# Patient Record
Sex: MALE | URBAN - METROPOLITAN AREA
[De-identification: names, ages, dates, MRNs, and addresses within clinical notes are randomized per-mention and may not be internally consistent; named-entity substitution may affect disease eponyms.]

---

## 2024-08-15 ENCOUNTER — TELEPHONE (OUTPATIENT)
Dept: DERMATOLOGY | Facility: CLINIC | Age: 79
End: 2024-08-15

## 2024-09-12 ENCOUNTER — PROCEDURE VISIT (OUTPATIENT)
Dept: DERMATOLOGY | Facility: CLINIC | Age: 79
End: 2024-09-12
Payer: COMMERCIAL

## 2024-09-12 VITALS
OXYGEN SATURATION: 95 % | SYSTOLIC BLOOD PRESSURE: 140 MMHG | DIASTOLIC BLOOD PRESSURE: 80 MMHG | HEART RATE: 62 BPM | WEIGHT: 209.8 LBS | TEMPERATURE: 97.3 F

## 2024-09-12 DIAGNOSIS — C44.92 SCC (SQUAMOUS CELL CARCINOMA): Primary | ICD-10-CM

## 2024-09-12 PROCEDURE — 17311 MOHS 1 STAGE H/N/HF/G: CPT | Performed by: STUDENT IN AN ORGANIZED HEALTH CARE EDUCATION/TRAINING PROGRAM

## 2024-09-12 PROCEDURE — 17312 MOHS ADDL STAGE: CPT | Performed by: STUDENT IN AN ORGANIZED HEALTH CARE EDUCATION/TRAINING PROGRAM

## 2024-09-12 RX ORDER — TAMSULOSIN HYDROCHLORIDE 0.4 MG/1
CAPSULE ORAL
COMMUNITY
Start: 2024-08-18

## 2024-09-12 RX ORDER — BACITRACIN ZINC AND POLYMYXIN B SULFATE 500; 10000 [USP'U]/G; [USP'U]/G
OINTMENT OPHTHALMIC
COMMUNITY
Start: 2024-05-29

## 2024-09-12 RX ORDER — PRAVASTATIN SODIUM 40 MG
TABLET ORAL
COMMUNITY
Start: 2024-09-08

## 2024-09-12 RX ORDER — LOSARTAN POTASSIUM 100 MG/1
100 TABLET ORAL DAILY
COMMUNITY
Start: 2024-08-18

## 2024-09-12 RX ORDER — CLOPIDOGREL BISULFATE 75 MG/1
75 TABLET ORAL DAILY
COMMUNITY

## 2024-09-12 RX ORDER — TETRACAINE HYDROCHLORIDE 5 MG/ML
2 SOLUTION OPHTHALMIC ONCE
Status: COMPLETED | OUTPATIENT
Start: 2024-09-12 | End: 2024-09-12

## 2024-09-12 RX ADMIN — TETRACAINE HYDROCHLORIDE 2 DROP: 5 SOLUTION OPHTHALMIC at 16:29

## 2024-09-12 NOTE — LETTER
2024     Turner Sal DO  [address] [facility] [address]    Patient: Ion Prado   YOB: 1945   Date of Visit: 2024       Dear Dr. Thompson:    Thank you for referring Ion Prado to me for his Mohs. Below are my notes for this consultation.    If you have questions, please do not hesitate to call me. I look forward to following your patient along with you.         Sincerely,        Cher Pizarro MD        CC: No Recipients    Cher Pizarro MD  2024  4:23 PM  Incomplete  MOHS Procedure Note    Patient: Ion Prado  : 1945  MRN: [id number]  Date: 24     History of Present Illness: The patient is a 78 y.o. male who presents with complaints of a biopsy proven SCC on the right lower eyelid.    Past Medical History:   Diagnosis Date   • Squamous cell skin cancer 2024    right lower lid, mohs       Past Surgical History:   Procedure Laterality Date   • CARDIAC PACEMAKER PLACEMENT  2016   • MOHS SURGERY Right 2024    SCC right lower lid, Dr Pizarro   • VALVE REPLACEMENT  2016         Current Outpatient Medications:   •  bacitracin-polymyxin b ophthalmic ointment, Apply to right lower lid margin twice a day, Disp: , Rfl:   •  clopidogrel (PLAVIX) 75 mg tablet, Take 75 mg by mouth daily, Disp: , Rfl:   •  cyanocobalamin (VITAMIN B-12) 1000 MCG tablet, Take 2,000 mcg by mouth daily, Disp: , Rfl:   •  losartan (COZAAR) 100 MG tablet, Take 100 mg by mouth daily, Disp: , Rfl:   •  pravastatin (PRAVACHOL) 40 mg tablet, , Disp: , Rfl:   •  tamsulosin (FLOMAX) 0.4 mg, take 1 capsule by mouth every day at night, Disp: , Rfl:     Current Facility-Administered Medications:   •  tetracaine 0.5 % ophthalmic solution 2 drop, 2 drop, Right Eye, Once,     Allergies   Allergen Reactions   • Soap Rash     Dial Brand      Other Reaction(s): Rash       Physical Exam:   Vitals:    24 1043   BP: 140/80   Pulse: 62   Temp:    SpO2: 95%     General:  Awake, Alert, Oriented x 3, Mood and affect appropriate  Respiratory: Respirations even and unlabored  Cardiovascular: Peripheral pulses intact; no edema  Musculoskeletal Exam: N/A  Skin: right lower eyelid with a 1.8 x 1.4 cm keratotic nodule on the right lower eyelid margin onto lower eyelid.  No palpable cervical LAD    Assessment: Biopsy proven SCC on the right lower eyelid     Plan: Mohs    Time of H&P Completion: 7:48AM    MOHS Procedure Timeout      Flowsheet Row Most Recent Value   Timeout: 0748   Patient Identity Verified: Yes   Correct Site Verified: Yes   Correct Procedure Verified: Yes            MOHS Diagnosis/Indication/Location/ID      Flowsheet Row Most Recent Value   Pathology Type Squamous cell carcinoma   Anatomic Site right lower eyelid   Indications for MOHS tumor location   MOHS ID [id number]               MOHS Site/Accession/Pre-Post      Flowsheet Row Most Recent Value   Original Site Identified (as submitted by referring clinician) Photo, Referral   Biopsy Accession/Specimen # (as submitted by referring clincian) [id number]   Pre-MOHS Size Length (cm) 1.8   Pre-MOHS Size Width (cm) 1.4   Post-MOHS Size-Length (cm) 3.3   Post MOHS Size-Width (cm) 1.5   Repair Type Patient referred to another physician   Anesthetic Used 1% Lidocaine with epinephrine  [with bicarbonate]            MOHS Tumor Stage 1 Information      Flowsheet Row Most Recent Value   Tissue Sections (blocks) 1   Microscopic Exam Section 1: Examination revealed a keratin-producing proliferation of atypical keratinocytes with invasion into the reticular dermis consistent with invasive squamous cell carcinoma. Mitoses and atypical forms are evident.   Tumor Clear After Stage I? No            MOHS Tumor Stage 2 Information      Flowsheet Row Most Recent Value   Tissue Sections (blocks) 3   Microscopic Exam Section 1: No tumor identified in section.   Microscopic Exam Section 2: No tumor identified in section.   Microscopic Exam  Section 3: No tumor identified in section.   Tumor Clear After Stage II? Yes              Patient identified, procedure verified, site identified and verified. Time out completed. Surgical removal of the lesion discussed with the patient (risks and benefits, including possibility of scarring, infection, recurrence or potential for further treatment)  I have specifically identified the site with the patient. I have discussed the fact that the patient will have a scar after the procedure regardless of granulation or repair with sutures. I have discussed that the repair options can range from granulation in some cases to linear or curvilinear closures to larger flaps or grafts.  There are sometimes flaps or grafts used that require multiples stages of surgery and will not be completed today, rather be completed over a series of appointments. I have discussed that occasionally due to location, size or depth of the lesion I may recommend consultation with and transfer of care for further removal or the reconstruction to another provider such as ophthalmology surgery, plastic surgery, ENT surgery, or surgical oncology. There are cases in which other testing such as imaging with MRI or CT scan or testing of lymph nodes is recommended because of the nature/depth/location of tumor seen during the removal. There is a risk of injury to nerves causing temporary or permanent numbness or the inability to move muscles full such as the inability to lift eyebrows. Questions answered and verbal and written consent was obtained.    The tumor qualifies for Mohs based on AUC criteria. Dr. Pizarro served as the surgeon and pathologist during the procedure.  The patient tolerated the procedure well.  The wound was dressed with a non-stick pad, and a compression dressing. Wound care was discussed with the patient, and a wound care instruction sheet was given.    The patient is due to see Dr. Thompson (Oculoplastics) for closure on 09/12/24.      Postoperative care: Wound care discussed at length.  I urged the patient to call us if any problems or question should arise.     Complications: none  Post-op medications: none  Patient condition after procedure: stable  Discharge plans: Closure planned with referral doctor as noted above. Discussed need to follow up with general dermatologist given hx of skin cancer as he is at high risk for new cutaneous malignancy in the first year. Pt voiced understanding. Will send message to staff to call to schedule if he desires.

## 2024-09-12 NOTE — Clinical Note
This is a new patient to us in Mohs- hehad his Mohs but needs to establish care with a general dermatologist- can we please call to get him scheduled?   TY!

## 2024-09-12 NOTE — PROGRESS NOTES
Mohs Procedure Note    Patient: Ion Prado  : 1945  MRN: [id number]  Date: 24     History of Present Illness: The patient is a 78 y.o. male who presents with complaints of a biopsy proven SCC on the right lower eyelid.    Past Medical History:   Diagnosis Date    Squamous cell skin cancer 2024    right lower lid, mohs       Past Surgical History:   Procedure Laterality Date    CARDIAC PACEMAKER PLACEMENT  2016    MOHS SURGERY Right 2024    SCC right lower lid, Dr Pizarro    VALVE REPLACEMENT  2016         Current Outpatient Medications:     bacitracin-polymyxin b ophthalmic ointment, Apply to right lower lid margin twice a day, Disp: , Rfl:     clopidogrel (PLAVIX) 75 mg tablet, Take 75 mg by mouth daily, Disp: , Rfl:     cyanocobalamin (VITAMIN B-12) 1000 MCG tablet, Take 2,000 mcg by mouth daily, Disp: , Rfl:     losartan (COZAAR) 100 MG tablet, Take 100 mg by mouth daily, Disp: , Rfl:     pravastatin (PRAVACHOL) 40 mg tablet, , Disp: , Rfl:     tamsulosin (FLOMAX) 0.4 mg, take 1 capsule by mouth every day at night, Disp: , Rfl:     Current Facility-Administered Medications:     tetracaine 0.5 % ophthalmic solution 2 drop, 2 drop, Right Eye, Once,     Allergies   Allergen Reactions    Soap Rash     Dial Brand      Other Reaction(s): Rash       Physical Exam:   Vitals:    24 1043   BP: 140/80   Pulse: 62   Temp:    SpO2: 95%     General: Awake, Alert, Oriented x 3, Mood and affect appropriate  Respiratory: Respirations even and unlabored  Cardiovascular: Peripheral pulses intact; no edema  Musculoskeletal Exam: N/A  Skin: right lower eyelid with a 1.8 x 1.4 cm keratotic nodule on the right lower eyelid margin onto lower eyelid.  No palpable cervical LAD    Assessment: Biopsy proven SCC on the right lower eyelid     Plan: Mohs    Time of H&P Completion: 7:48AM    MOHS Procedure Timeout      Flowsheet Row Most Recent Value   Timeout: 6003   Patient Identity Verified: Yes   Correct  Site Verified: Yes   Correct Procedure Verified: Yes            MOHS Diagnosis/Indication/Location/ID      Flowsheet Row Most Recent Value   Pathology Type Squamous cell carcinoma   Anatomic Site right lower eyelid   Indications for MOHS tumor location   MOHS ID [id number]               MOHS Site/Accession/Pre-Post      Flowsheet Row Most Recent Value   Original Site Identified (as submitted by referring clinician) Photo, Referral   Biopsy Accession/Specimen # (as submitted by referring clincian) [id number]   Pre-MOHS Size Length (cm) 1.8   Pre-MOHS Size Width (cm) 1.4   Post-MOHS Size-Length (cm) 3.3   Post MOHS Size-Width (cm) 1.5   Repair Type Patient referred to another physician   Anesthetic Used 1% Lidocaine with epinephrine  [with bicarbonate]            MOHS Tumor Stage 1 Information    Level: Muscle and mucosa  Flowsheet Row Most Recent Value   Tissue Sections (blocks) 1   Microscopic Exam Section 1: Examination revealed a keratin-producing proliferation of atypical keratinocytes with invasion into the reticular dermis consistent with invasive squamous cell carcinoma. Mitoses and atypical forms are evident.   Tumor Clear After Stage I? No            MOHS Tumor Stage 2 Information    Level: Muscle and mucosa  Flowsheet Row Most Recent Value   Tissue Sections (blocks) 3   Microscopic Exam Section 1: No tumor identified in section.   Microscopic Exam Section 2: No tumor identified in section.   Microscopic Exam Section 3: No tumor identified in section.   Tumor Clear After Stage II? Yes          Patient identified, procedure verified, site identified and verified. Time out completed. Surgical removal of the lesion discussed with the patient (risks and benefits, including possibility of scarring, infection, recurrence or potential for further treatment)  I have specifically identified the site with the patient. I have discussed the fact that the patient will have a scar after the procedure regardless of  granulation or repair with sutures. I have discussed that the repair options can range from granulation in some cases to linear or curvilinear closures to larger flaps or grafts.  There are sometimes flaps or grafts used that require multiples stages of surgery and will not be completed today, rather be completed over a series of appointments. I have discussed that occasionally due to location, size or depth of the lesion I may recommend consultation with and transfer of care for further removal or the reconstruction to another provider such as ophthalmology surgery, plastic surgery, ENT surgery, or surgical oncology. There are cases in which other testing such as imaging with MRI or CT scan or testing of lymph nodes is recommended because of the nature/depth/location of tumor seen during the removal. There is a risk of injury to nerves causing temporary or permanent numbness or the inability to move muscles full such as the inability to lift eyebrows. Questions answered and verbal and written consent was obtained.    The tumor qualifies for Mohs based on AUC criteria. Dr. Pizarro served as the surgeon and pathologist during the procedure.  The patient tolerated the procedure well.  The wound was dressed with a non-stick pad, and a compression dressing. Wound care was discussed with the patient, and a wound care instruction sheet was given.    The patient is due to see Dr. Thompson (Oculoplastics) for closure on 09/12/24.     Postoperative care: Wound care discussed at length.  I urged the patient to call us if any problems or question should arise.     Complications: none  Post-op medications: none  Patient condition after procedure: stable  Discharge plans: Closure planned with referral doctor as noted above. Discussed need to follow up with general dermatologist given hx of skin cancer as he is at high risk for new cutaneous malignancy in the first year. Pt voiced understanding. Will send message to staff to call  to schedule if he desires.

## 2024-10-31 ENCOUNTER — OFFICE VISIT (OUTPATIENT)
Age: 79
End: 2024-10-31
Payer: COMMERCIAL

## 2024-10-31 VITALS — WEIGHT: 211.3 LBS | TEMPERATURE: 97.9 F

## 2024-10-31 DIAGNOSIS — D22.9 MULTIPLE MELANOCYTIC NEVI: ICD-10-CM

## 2024-10-31 DIAGNOSIS — L81.4 LENTIGINES: ICD-10-CM

## 2024-10-31 DIAGNOSIS — L85.3 XEROSIS OF SKIN: ICD-10-CM

## 2024-10-31 DIAGNOSIS — Z85.828 HISTORY OF SCC (SQUAMOUS CELL CARCINOMA) OF SKIN: Primary | ICD-10-CM

## 2024-10-31 DIAGNOSIS — D18.01 CHERRY ANGIOMA: ICD-10-CM

## 2024-10-31 DIAGNOSIS — L82.1 SEBORRHEIC KERATOSES: ICD-10-CM

## 2024-10-31 PROCEDURE — 99203 OFFICE O/P NEW LOW 30 MIN: CPT | Performed by: REGISTERED NURSE

## 2024-10-31 NOTE — PATIENT INSTRUCTIONS
HISTORY OF SQUAMOUS CELL CARCINOMA     Assessment and Plan:  Based on a thorough discussion of this condition and the management approach to it (including a comprehensive discussion of the known risks, side effects and potential benefits of treatment), the patient (family) agrees to implement the following specific plan:  Routine skin exams  Apply SPF 30+     How can SCC be prevented?  The most important way to prevent SCC is to avoid sunburn. This is especially important in childhood and early life. Fair skinned individuals and those with a personal or family history of BCC should protect their skin from sun exposure daily, year-round and lifelong.  Stay indoors or under the shade in the middle of the day   Wear covering clothing   Apply high protection factor SPF50+ broad-spectrum sunscreens generously to exposed skin if outdoors   Avoid indoor tanning (sun beds, solaria)      What is the outlook for SCC?  Most SCC are cured by treatment. Cure is most likely if treatment is undertaken when the lesion is small. A small percent of SCC's can spread to lymph  nodes and long term monitoring is indicated.   They are also at increased risk of other skin cancers, especially melanoma. Regular self-skin examinations and long-term annual skin checks by an experienced health professional are recommended.       SEBORRHEIC KERATOSES  - Relevant exam: Scattered over the trunk/extremities are waxy brown to black plaques and papules with stuck on appearance  - Exam and clinical history consistent with seborrheic keratoses  - Counseled that these are benign growths that do not require treatment  - Counseled that removal of lesions is considered cosmetic and so would incur a fee should patient elect to move forward.   - Patient to hold on treatments for now but will inform us should they desire additional treatments    MELANOCYTIC NEVI  -Relevant exam: Scattered over the trunk/extremities are homogenously pigmented brown macules and  papules. ELM performed and without concerning findings.  - Exam and clinical history consistent with melanocytic nevi  - Educated on the ABCDE's of melanoma; handout provided  - Counseled to return to clinic prior to scheduled appointment should any of these lesions change or should any new lesions of concern arise  - Counseled on use of sun protection daily. Reviewed latest FDA sunscreen guidelines, including use of broad spectrum (UVA and UVB blocking) sunscreen or sun protective clothing with SPF 30-50 every 2-3 hours and reapplied after exposure to water; use of photoprotective clothing, including a broad brim hat and UPF rated clothing if outdoors for several hours; avoid use of tanning beds as these pose significant risk for melanoma and skin cancer.    LENTIGINES  OTHER SKIN CHANGES DUE TO CHRONIC EXPOSURE TO NONIONIZING RADIATION  - Relevant exam: Over sun exposed areas are brown macules. ELM performed and without concerning findings.  - Exam and clinical history consistent with lentigines.  - Educated that these are indicative of prior sun exposure.   - Counseled to return to clinic prior to scheduled appointment should any of these lesions change or should any new lesions of concern arise.  - Recommended use of sunscreen as above and below.  - Counseled on use of sun protection daily. Reviewed latest FDA sunscreen guidelines, including use of broad spectrum (UVA and UVB blocking) sunscreen or sun protective clothing with SPF 30-50 every 2-3 hours and reapplied after exposure to water; use of photoprotective clothing, including a broad brim hat and UPF rated clothing if outdoors for several hours; avoid use of tanning beds as these pose significant risk for melanoma and skin cancer.    CHERRY ANGIOMAS  - Relevant exam: Scattered over the trunk/extremities are red papules  - Exam and clinical history consistent with cherry angiomas  - Educated that these are benign  - Educated that removal is considered  "aesthetic and would incur a fee.  - Patient does not wish to pursue removal at this time but will contact us should this change.    XEROSIS (\"DRY SKIN\")    Assessment and Plan:  - History and physical consistent with xerosis  - Educated that dry skin is often exacerbated by low humidity conditions and during change of seasons  - Educated that gentle skin care and consistent use of emollients are the mainstay of treatment. Recommended use of occlusive emollient, such as vaseline, aquaphor or aveeno eczema balm. If unable to tolerate, noted that thick white cream is next best.   Based on a thorough discussion of this condition and the management approach to it (including a comprehensive discussion of the known risks, side effects and potential benefits of treatment), the patient (family) agrees to implement the following specific plan:  Start daily emollient to moist skin.  Take luke warm showers (not hot)  Avoid scratching.   Transition to fragrance free, non-irritating skin care as possible.      Tip for relief: If itchy or uncomfortable, placing emollient (like vaseline) in the fridge is a great trick as the cool sensation is soothing.         "

## 2024-10-31 NOTE — PROGRESS NOTES
"[facility]atology [facility] Note     Patient Name: Ion Prado  Encounter Date: 10/31/24     Have you been cared for by a [facility] Dermatologist in the last 3 years and, if so, which description applies to you?    YES.  I HAVE been seen here within the last 3 years, and my medical history HAS changed.   I am MALE/not capable of bearing children    REVIEW OF SYSTEMS:  Have you recently had or currently have any of the following? Recent fever or chills? No  Any non-healing wound? No   PAST MEDICAL HISTORY:  Have you personally ever had or currently have any of the following?  If \"YES,\" then please provide more detail. Skin cancer (such as Melanoma, Basal Cell Carcinoma, Squamous Cell Carcinoma?  YES, squamous cell carcinoma  Tuberculosis, HIV/AIDS, Hepatitis B or C: No  Radiation Treatment No   HISTORY OF IMMUNOSUPPRESSION:   Do you have a history of any of the following:  Systemic Immunosuppression such as Diabetes, Biologic or Immunotherapy, Chemotherapy, Organ Transplantation, Bone Marrow Transplantation or Prednisone?  No     Answering \"YES\" requires the addition of the dotphrase \"IMMUNOSUPPRESSED\" as the first diagnosis of the patient's visit.   FAMILY HISTORY:  Any \"first degree relatives\" (parent, brother, sister, or child) with the following?    Skin Cancer, Pancreatic or Other Cancer? YES, dad-unknown cancer   PATIENT EXPERIENCE:    Do you want the Dermatologist to perform a COMPLETE skin exam today including a clinical examination under the \"bra and underwear\" areas?  Yes  If necessary, do we have your permission to call and leave a detailed message on your Preferred Phone number that includes your specific medical information?  Yes      Allergies   Allergen Reactions    Soap Rash     Dial Brand      Other Reaction(s): Rash      Current Outpatient Medications:     bacitracin-polymyxin b ophthalmic ointment, Apply to right lower lid margin twice a day, Disp: , Rfl:     clopidogrel (PLAVIX) 75 mg tablet, " Take 75 mg by mouth daily, Disp: , Rfl:     cyanocobalamin (VITAMIN B-12) 1000 MCG tablet, Take 2,000 mcg by mouth daily, Disp: , Rfl:     losartan (COZAAR) 100 MG tablet, Take 100 mg by mouth daily, Disp: , Rfl:     pravastatin (PRAVACHOL) 40 mg tablet, , Disp: , Rfl:     tamsulosin (FLOMAX) 0.4 mg, take 1 capsule by mouth every day at night, Disp: , Rfl:           Whom besides the patient is providing clinical information about today's encounter?   NO ADDITIONAL HISTORIAN (patient alone provided history)    Physical Exam and Assessment/Plan by Diagnosis:    HISTORY OF SQUAMOUS CELL CARCINOMA     Physical Exam:  Anatomic Location Affected:  right lower eyelid  Morphological Description of Scar:  well healing scar  Suspected Recurrence: no  Regional adenopathy: no    Additional History of Present Condition:  Mohs performed 9/12/24    Assessment and Plan:  Based on a thorough discussion of this condition and the management approach to it (including a comprehensive discussion of the known risks, side effects and potential benefits of treatment), the patient (family) agrees to implement the following specific plan:  Routine skin exams  Apply SPF 30+     How can SCC be prevented?  The most important way to prevent SCC is to avoid sunburn. This is especially important in childhood and early life. Fair skinned individuals and those with a personal or family history of BCC should protect their skin from sun exposure daily, year-round and lifelong.  Stay indoors or under the shade in the middle of the day   Wear covering clothing   Apply high protection factor SPF50+ broad-spectrum sunscreens generously to exposed skin if outdoors   Avoid indoor tanning (sun beds, solaria)      What is the outlook for SCC?  Most SCC are cured by treatment. Cure is most likely if treatment is undertaken when the lesion is small. A small percent of SCC's can spread to lymph  nodes and long term monitoring is indicated.   They are also at  increased risk of other skin cancers, especially melanoma. Regular self-skin examinations and long-term annual skin checks by an experienced health professional are recommended.       SEBORRHEIC KERATOSES  - Relevant exam: Scattered over the trunk/extremities are waxy brown to black plaques and papules with stuck on appearance  - Exam and clinical history consistent with seborrheic keratoses  - Counseled that these are benign growths that do not require treatment  - Counseled that removal of lesions is considered cosmetic and so would incur a fee should patient elect to move forward.   - Patient to hold on treatments for now but will inform us should they desire additional treatments    MELANOCYTIC NEVI  -Relevant exam: Scattered over the trunk/extremities are homogenously pigmented brown macules and papules. ELM performed and without concerning findings.  - Exam and clinical history consistent with melanocytic nevi  - Educated on the ABCDE's of melanoma; handout provided  - Counseled to return to clinic prior to scheduled appointment should any of these lesions change or should any new lesions of concern arise  - Counseled on use of sun protection daily. Reviewed latest FDA sunscreen guidelines, including use of broad spectrum (UVA and UVB blocking) sunscreen or sun protective clothing with SPF 30-50 every 2-3 hours and reapplied after exposure to water; use of photoprotective clothing, including a broad brim hat and UPF rated clothing if outdoors for several hours; avoid use of tanning beds as these pose significant risk for melanoma and skin cancer.    LENTIGINES  OTHER SKIN CHANGES DUE TO CHRONIC EXPOSURE TO NONIONIZING RADIATION  - Relevant exam: Over sun exposed areas are brown macules. ELM performed and without concerning findings.  - Exam and clinical history consistent with lentigines.  - Educated that these are indicative of prior sun exposure.   - Counseled to return to clinic prior to scheduled  "appointment should any of these lesions change or should any new lesions of concern arise.  - Recommended use of sunscreen as above and below.  - Counseled on use of sun protection daily. Reviewed latest FDA sunscreen guidelines, including use of broad spectrum (UVA and UVB blocking) sunscreen or sun protective clothing with SPF 30-50 every 2-3 hours and reapplied after exposure to water; use of photoprotective clothing, including a broad brim hat and UPF rated clothing if outdoors for several hours; avoid use of tanning beds as these pose significant risk for melanoma and skin cancer.    CHERRY ANGIOMAS  - Relevant exam: Scattered over the trunk/extremities are red papules  - Exam and clinical history consistent with cherry angiomas  - Educated that these are benign  - Educated that removal is considered aesthetic and would incur a fee.  - Patient does not wish to pursue removal at this time but will contact us should this change.    XEROSIS (\"DRY SKIN\")    Physical Exam:  Anatomic Location Affected:  face and feet  Morphological Description:  xerotic patches  Pertinent Positives:  Pertinent Negatives:    Additional History of Present Condition:    - Current treatments: none    Assessment and Plan:  - History and physical consistent with xerosis  - Educated that dry skin is often exacerbated by low humidity conditions and during change of seasons  - Educated that gentle skin care and consistent use of emollients are the mainstay of treatment. Recommended use of occlusive emollient, such as vaseline, aquaphor or aveeno eczema balm. If unable to tolerate, noted that thick white cream is next best.   Based on a thorough discussion of this condition and the management approach to it (including a comprehensive discussion of the known risks, side effects and potential benefits of treatment), the patient (family) agrees to implement the following specific plan:  Start daily emollient to moist skin.  Take luke warm showers " (not hot)  Avoid scratching.   Transition to fragrance free, non-irritating skin care as possible.      Tip for relief: If itchy or uncomfortable, placing emollient (like vaseline) in the fridge is a great trick as the cool sensation is soothing.             Ventura Mcneil MD  PGY-II Dermatology Resident     Scribe Attestation      I,:  Carmela Aden am acting as a scribe while in the presence of the attending physician.:       I,:  Roman Benitez MD personally performed the services described in this documentation    as scribed in my presence.: